# Patient Record
Sex: FEMALE | Race: WHITE | Employment: STUDENT | URBAN - METROPOLITAN AREA
[De-identification: names, ages, dates, MRNs, and addresses within clinical notes are randomized per-mention and may not be internally consistent; named-entity substitution may affect disease eponyms.]

---

## 2017-05-19 ENCOUNTER — OFFICE VISIT (OUTPATIENT)
Dept: OBGYN CLINIC | Facility: CLINIC | Age: 22
End: 2017-05-19

## 2017-05-19 VITALS
WEIGHT: 121 LBS | DIASTOLIC BLOOD PRESSURE: 68 MMHG | HEIGHT: 64 IN | SYSTOLIC BLOOD PRESSURE: 104 MMHG | BODY MASS INDEX: 20.66 KG/M2

## 2017-05-19 DIAGNOSIS — Z30.09 FAMILY PLANNING COUNSELING: Primary | ICD-10-CM

## 2017-05-19 PROCEDURE — 99202 OFFICE O/P NEW SF 15 MIN: CPT | Performed by: OBSTETRICS & GYNECOLOGY

## 2017-05-19 NOTE — PROGRESS NOTES
GYN H&P     2017  10:36 AM    CC:Patient presents for exam    HPI: Patient is a 22year old  Patient's last menstrual period was 2017.  who presents for exam.  Currently using OCP's for prolonged bleeding, but has been having difficulty ge

## 2017-05-30 ENCOUNTER — OFFICE VISIT (OUTPATIENT)
Dept: OBGYN CLINIC | Facility: CLINIC | Age: 22
End: 2017-05-30

## 2017-05-30 VITALS
SYSTOLIC BLOOD PRESSURE: 100 MMHG | HEIGHT: 64 IN | BODY MASS INDEX: 20.32 KG/M2 | WEIGHT: 119 LBS | DIASTOLIC BLOOD PRESSURE: 70 MMHG

## 2017-05-30 DIAGNOSIS — Z32.02 PREGNANCY EXAMINATION OR TEST, NEGATIVE RESULT: ICD-10-CM

## 2017-05-30 DIAGNOSIS — Z30.430 ENCOUNTER FOR IUD INSERTION: Primary | ICD-10-CM

## 2017-05-30 PROCEDURE — 58300 INSERT INTRAUTERINE DEVICE: CPT | Performed by: OBSTETRICS & GYNECOLOGY

## 2017-05-30 PROCEDURE — 81025 URINE PREGNANCY TEST: CPT | Performed by: OBSTETRICS & GYNECOLOGY

## 2017-05-30 RX ORDER — ESCITALOPRAM OXALATE 20 MG/1
TABLET ORAL
Refills: 0 | COMMUNITY
Start: 2017-05-24

## 2017-05-30 NOTE — PROGRESS NOTES
IUD Insertion     Pregnancy Results: UCG negative  Birth control method(s) used: OCP's  LMP: 5/28/2017    Consent signed. Procedure discussed with the patient in detail including indication, risks, benefits, alternatives and complications.  I discussed wit

## 2017-06-08 ENCOUNTER — LAB SERVICES (OUTPATIENT)
Dept: OTHER | Age: 22
End: 2017-06-08

## 2017-06-08 ENCOUNTER — CHARTING TRANS (OUTPATIENT)
Dept: OTHER | Age: 22
End: 2017-06-08

## 2017-06-08 LAB — RAPID STREP GROUP A: NORMAL

## 2018-07-19 ENCOUNTER — TELEPHONE (OUTPATIENT)
Dept: OBGYN CLINIC | Facility: CLINIC | Age: 23
End: 2018-07-19

## 2018-07-19 NOTE — TELEPHONE ENCOUNTER
Patient states had her Mirena inserted a Year ago states wasn't having any periods but now has light spotting and cramps . Patient very concerned

## 2018-07-19 NOTE — TELEPHONE ENCOUNTER
Pt started spotting about 2 weeks ago but not enough to use a panty liner per pt. Pt believes to be able to feel strings but unsure. Pt currently in [place]. Pt going to see a MD tomorrow in [facility]. Rambo Reagan Pt had no further questions.  Advised pt to keep her appmt for ryanne

## 2018-11-03 VITALS
HEIGHT: 64 IN | DIASTOLIC BLOOD PRESSURE: 60 MMHG | HEART RATE: 74 BPM | WEIGHT: 120 LBS | SYSTOLIC BLOOD PRESSURE: 108 MMHG | RESPIRATION RATE: 16 BRPM | TEMPERATURE: 99 F | BODY MASS INDEX: 20.49 KG/M2

## 2024-07-25 ENCOUNTER — OFFICE VISIT (OUTPATIENT)
Dept: OBGYN CLINIC | Facility: CLINIC | Age: 29
End: 2024-07-25
Payer: COMMERCIAL

## 2024-07-25 VITALS
DIASTOLIC BLOOD PRESSURE: 68 MMHG | WEIGHT: 133.19 LBS | SYSTOLIC BLOOD PRESSURE: 112 MMHG | HEIGHT: 64 IN | BODY MASS INDEX: 22.74 KG/M2

## 2024-07-25 DIAGNOSIS — Z92.0 HISTORY OF USE OF CONTRACEPTIVE INTRAUTERINE DEVICE (IUD): Primary | ICD-10-CM

## 2024-07-25 PROCEDURE — 99203 OFFICE O/P NEW LOW 30 MIN: CPT | Performed by: OBSTETRICS & GYNECOLOGY

## 2024-07-25 RX ORDER — SERTRALINE HYDROCHLORIDE 150 MG/1
CAPSULE ORAL
COMMUNITY

## 2024-07-25 RX ORDER — ARIPIPRAZOLE 2 MG/1
2 TABLET ORAL DAILY
COMMUNITY
Start: 2022-04-17

## 2024-07-25 RX ORDER — TRAZODONE HYDROCHLORIDE 100 MG/1
TABLET ORAL
COMMUNITY

## 2024-07-25 RX ORDER — MISOPROSTOL 200 UG/1
200 TABLET ORAL
Qty: 1 TABLET | Refills: 0 | Status: SHIPPED | OUTPATIENT
Start: 2024-07-25 | End: 2024-07-26

## 2024-07-25 NOTE — PROGRESS NOTES
NEW GYN H&P     2024  1:59 PM    Chief Complaint   Patient presents with    New Patient     New pt here for consult on Mirena IUD removal and reinsertion, Mirena inserted 17   .    HPI: Patient is a 28 year old  LMP absent here to establish care and consult about removal and replacement of current Mirena IUD placed . No gynecologic concerns or complaints. No pelvic pain. No abnormal vaginal discharge or bleeding. Had extreme discomfort with last insertion so prefers premedication prior to procedure. Instructed on use of oral Cytotec 200 mcg at bedtime and Naproxen one hour before procedure. Questions answered and agrees with plan.    No LMP recorded (lmp unknown). (Menstrual status: IUD - Intrauterine Device).    OB History    Para Term  AB Living   0 0 0 0 0 0   SAB IAB Ectopic Multiple Live Births   0 0 0 0         GYN hx:    Hx Prior Abnormal Pap: No  Pap Date: 22  Pap Result Notes: wnl done at [facility]  CONTRACEPTION: Mirena  LAST MAMMOGRAM: N/A      Current Outpatient Medications   Medication Sig Dispense Refill    ARIPiprazole 2 MG Oral Tab Take 1 tablet (2 mg total) by mouth daily.      Levonorgestrel (MIRENA, 52 MG, IU) by Intrauterine route.      Sertraline HCl 150 MG Oral Cap       traZODone 100 MG Oral Tab TAKE ONE TABLET BY MOUTH DAILY AT 9:00 PM         Past Medical History:    Amenorrhea    I have had an IUD since 2017    Anxiety    DIAGNOSED IN HS    Depression    DIAGNOSED IN HS    Heart murmur    History of use of contraceptive intrauterine device (IUD)    Mirena IUD 17-current    Sexually transmitted disease    treated in once pill     Past Surgical History:   Procedure Laterality Date    Insert intrauterine device  2017    mirena    Patient denies any surgical history       Allergies   Allergen Reactions    Amoxicillin RASH     Family History   Problem Relation Age of Onset    Psychiatric Father         Depression    Psychiatric Paternal  Grandmother         Depression ( and her dad)    Breast Cancer Neg     Ovarian Cancer Neg     Uterine Cancer Neg     Colon Cancer Neg      Social History     Socioeconomic History    Marital status: Single   Occupational History    Occupation: student     Comment: [employer] in [place] studing [employer]   Tobacco Use    Smoking status: Every Day     Current packs/day: 0.50     Average packs/day: 0.5 packs/day for 5.0 years (2.5 ttl pk-yrs)     Types: Cigarettes   Substance and Sexual Activity    Alcohol use: Yes     Alcohol/week: 2.0 standard drinks of alcohol     Types: 2 Glasses of wine per week    Drug use: Yes     Types: Cannabis    Sexual activity: Yes     Partners: Male     Birth control/protection: Mirena     Comment: Mirena IUD 05/30/17-current     Social History     Social History Narrative    No h/o abuse    Lives in [place]       ROS:     Review of Systems:  A comprehensive 10 point ROS was completed. All pertinent positives and negatives noted in the HPI.     /68   Ht 64\"   Wt 133 lb 3.2 oz (60.4 kg)   LMP  (LMP Unknown)   BMI 22.86 kg/m²       A/P: Patient is 28 year old female     1. History of use of contraceptive intrauterine device (IUD)  - Return for removal and replacement  - Cytotec 200 mcg at bedtime and Naproxen one hour before procedure      Total time spent = 30 minutes  >50% visit = face to face counseling and coordination of care        7/25/2024  Eva Shah MD

## 2024-08-20 ENCOUNTER — OFFICE VISIT (OUTPATIENT)
Dept: OBGYN CLINIC | Facility: CLINIC | Age: 29
End: 2024-08-20
Payer: COMMERCIAL

## 2024-08-20 DIAGNOSIS — Z30.433 ENCOUNTER FOR REMOVAL AND REINSERTION OF IUD: ICD-10-CM

## 2024-08-20 DIAGNOSIS — Z32.02 PREGNANCY EXAMINATION OR TEST, NEGATIVE RESULT: Primary | ICD-10-CM

## 2024-08-20 LAB
CONTROL LINE PRESENT WITH A CLEAR BACKGROUND (YES/NO): YES YES/NO
KIT LOT #: NORMAL NUMERIC
PREGNANCY TEST, URINE: NEGATIVE

## 2024-08-20 PROCEDURE — 58301 REMOVE INTRAUTERINE DEVICE: CPT | Performed by: OBSTETRICS & GYNECOLOGY

## 2024-08-20 PROCEDURE — 58300 INSERT INTRAUTERINE DEVICE: CPT | Performed by: OBSTETRICS & GYNECOLOGY

## 2024-08-20 PROCEDURE — 81025 URINE PREGNANCY TEST: CPT | Performed by: OBSTETRICS & GYNECOLOGY

## 2024-08-20 NOTE — PROCEDURES
[facility]  Obstetrics and Gynecology  IUD Removal and IUD Insertion Procedure Note  Eva Shah MD    Arianna Babcock is a 28 year old female presenting for IUD Removal and Insertion.     Pelvic Exam Findings:  Cervix normal, small amount of discharge noted. Uterus normal.     IUD Removal:      Pregnancy Results: negative from urine test   Birth control method(s) used: IUD    Procedure discussed with the patient in detail including indication, risks, benefits, alternatives and complications.  Consent signed.    Procedure:  Speculum placed in the vagina.   Betadine wash of vagina and cervix.  Strings were visualized.  Ring forcep was used to grasp IUD strings.  Mirena IUD was removed without difficulty.  The patient tolerated the procedure well.    IUD Insertion:       Procedure discussed with the patient in detail including indication, risks, benefits, alternatives and complications.  Consent signed.    Procedure:  Speculum already in place the vagina.  Repeat Betadine wash of vagina and cervix.  Single tooth tenaculum was placed at the 12 o'clock position.  Uterus sounded to 6 cm.  Mirena IUD was placed without difficulty.  Strings cut at 3 cm.  Single tooth tenaculum removed.  Patient tolerated procedure well.    Visit Plan:  IUD surveillance was discussed with the patient.  To return after next menses for string check.      Eva Shah MD  1:30 PM  8/20/2024

## (undated) NOTE — LETTER
Arianna Babcock, :1995    CONSENT FOR PROCEDURE/SEDATION    1. I authorize the performance upon Arianna Babcock  the following:  Mirena IUD removal/Mirena IUD reinsertion    2. I authorize Dr. Eva Shah MD (and whomever is designated as the doctor’s assistant), to perform the above-mentioned procedures.    3. If any unforeseen conditions arise during this procedure calling for additional  procedures, operations, or medications (including anesthesia and blood transfusion), I further request and authorize the doctor to do whatever he/she deems advisable in my interest.    4. I consent to the taking and reproduction of any photographs in the course of this procedure for professional purposes.    5. I consent to the administration of such sedation as may be considered necessary or advisable by the physician responsible for this service, with the exception of ______________________________________________________    6. I have been informed by my doctor of the nature and purpose of this procedure sedation, possible alternative methods of treatment, risk involved and possible complications.      Signature of Patient:_______________________________________________    Signature of person authorized to consent for patient:  _______________________________________________________________    Relationship to patient: ____________________________________________    Witness: _________________________________________ Date:___________     Physician Signature: _______________________________ Date:___________

## (undated) NOTE — MR AVS SNAPSHOT
[address] at [place] [address]  [phone]               Thank you for choosing us for your health care visit with Mey Restrepo MD.  We are glad to serve you and happy to [email or web address] will allow you to access patient instructions from your recent visit,  view other health information, and more. To sign up or find more information, go to [email or web address] and click on the Sign Up Now link in the [employer] box.      Enter

## (undated) NOTE — Clinical Note
Arianna Babcock, :1995    CONSENT FOR PROCEDURE/SEDATION    1. I authorize the performance upon Arianna Babcock  the following: Mirena IUD Insertion    2.  I authorize Dr. Marquis Adamson MD (and whomever is designated as the doctor’s a Relationship to patient: ____________________________________________    Witness: _________________________________________ Date:___________     Physician Signature: _______________________________ Date:___________

## (undated) NOTE — MR AVS SNAPSHOT
[address] at [place] [address]  [phone]               Thank you for choosing us for your health care visit with Nikki Vyas MD.  We are glad to serve you and happy to neftaly medical emergencies, dial 911.            Visit [facility] online at  [email or web address]